# Patient Record
Sex: MALE | Race: WHITE | NOT HISPANIC OR LATINO | Employment: FULL TIME | URBAN - METROPOLITAN AREA
[De-identification: names, ages, dates, MRNs, and addresses within clinical notes are randomized per-mention and may not be internally consistent; named-entity substitution may affect disease eponyms.]

---

## 2023-12-21 NOTE — TELEPHONE ENCOUNTER
FUTURE VISIT INFORMATION      FUTURE VISIT INFORMATION:  Date: 2/22/24  Time: 8:20am  Location: [facility]  REFERRAL INFORMATION:  Reason for visit/diagnosis  Blepharitis consultation, last eye exam approx 4 mos ago, pt previously seen by [facility] in [facility],Dr.Gretchen Duarte OD     RECORDS REQUESTED FROM:       Clinic name Comments Records Status Imaging Status   [place] [facility]/notes 1/18/23- 9/18/20 Care Everywhere

## 2024-02-19 ENCOUNTER — TELEPHONE (OUTPATIENT)
Dept: OPHTHALMOLOGY | Facility: CLINIC | Age: 49
End: 2024-02-19
Payer: COMMERCIAL

## 2024-02-22 ENCOUNTER — PRE VISIT (OUTPATIENT)
Dept: OPHTHALMOLOGY | Facility: CLINIC | Age: 49
End: 2024-02-22

## 2024-02-22 ENCOUNTER — OFFICE VISIT (OUTPATIENT)
Dept: OPHTHALMOLOGY | Facility: CLINIC | Age: 49
End: 2024-02-22
Payer: COMMERCIAL

## 2024-02-22 DIAGNOSIS — H02.88B MEIBOMIAN GLAND DYSFUNCTION (MGD) OF UPPER AND LOWER LIDS OF BOTH EYES: Primary | ICD-10-CM

## 2024-02-22 DIAGNOSIS — H02.88A MEIBOMIAN GLAND DYSFUNCTION (MGD) OF UPPER AND LOWER LIDS OF BOTH EYES: Primary | ICD-10-CM

## 2024-02-22 PROCEDURE — 99203 OFFICE O/P NEW LOW 30 MIN: CPT | Performed by: OPHTHALMOLOGY

## 2024-02-22 RX ORDER — BACITRACIN ZINC AND POLYMYXIN B SULFATE 500; 10000 [USP'U]/G; [USP'U]/G
0.5 OINTMENT OPHTHALMIC 2 TIMES DAILY
Qty: 3.5 G | Refills: 4 | Status: SHIPPED | OUTPATIENT
Start: 2024-02-22

## 2024-02-22 ASSESSMENT — CONF VISUAL FIELD
OS_SUPERIOR_TEMPORAL_RESTRICTION: 0
OD_NORMAL: 1
OS_SUPERIOR_NASAL_RESTRICTION: 0
OD_INFERIOR_NASAL_RESTRICTION: 0
OS_INFERIOR_NASAL_RESTRICTION: 0
OD_SUPERIOR_TEMPORAL_RESTRICTION: 0
OS_NORMAL: 1
OD_SUPERIOR_NASAL_RESTRICTION: 0
OS_INFERIOR_TEMPORAL_RESTRICTION: 0
METHOD: COUNTING FINGERS
OD_INFERIOR_TEMPORAL_RESTRICTION: 0

## 2024-02-22 ASSESSMENT — TONOMETRY
OD_IOP_MMHG: 16
OS_IOP_MMHG: 16
IOP_METHOD: ICARE

## 2024-02-22 ASSESSMENT — VISUAL ACUITY
OD_SC+: -1
METHOD: SNELLEN - LINEAR
OS_SC: 20/20
OS_SC+: -1
OD_SC: 20/20

## 2024-02-22 NOTE — NURSING NOTE
"Chief Complaints and History of Present Illnesses   Patient presents with    Consult For     Ko MARCIANO Marshall is being seen for a consult today for blepharitis evaluation.      Chief Complaint(s) and History of Present Illness(es)       Consult For              Associated symptoms: itching, discharge and swelling.  Negative for dryness, eye pain, redness and tearing    Treatments tried: eye drops and ointment    Pain scale: 0/10    Comments: Ko Marshall is being seen for a consult today for blepharitis evaluation.               Comments    Beginning 4 years ago, itching and crusty white discharge from both upper lids.  Occasionally lid swelling pokes eyelashes into his eyes causing irritation.   Attempted WC/lid scrubs for 3 months and \"it didn't help\"    Ocular meds:  Erythromycin BID each eyelid  Lubricating gtts PRN     Cooper Gonzalez 8:21 AM February 22, 2024                      "

## 2024-02-22 NOTE — PROGRESS NOTES
"HPI  Ko Marshall is a 48 year old year old male here for lid irritation.  [place], but most things have not given him much relief.  He does feel that Erythromycin ointment twice a day helps with itching.   HPI     Consult For    Associated symptoms include itching, discharge and swelling.  Negative for dryness, eye pain, redness and tearing.  Treatments tried include eye drops and ointment.  Pain was noted as 0/10. Additional comments: Ko Marshall is being seen for a consult today for blepharitis evaluation.            Comments    Beginning 4 years ago, itching and crusty white discharge from both upper lids.  Occasionally lid swelling pokes eyelashes into his eyes causing irritation.   Attempted WC/lid scrubs for 3 months and \"it didn't help\"    Ocular meds:  Erythromycin BID each eyelid  Lubricating gtts PRN     Cooper Gonzalez 8:21 AM February 22, 2024              Last edited by Cooper Gonzalez on 2/22/2024  8:21 AM.          PMH:  History reviewed. No pertinent past medical history.   POH:  No glasses , Meibomian gland dysfunction , no surgery, no trauma  Oc Meds: erythromycin ointment  Artificial tear drops  FH:  Denies any glaucoma, age related macular degeneration, or other known eye diseases       Assessment & Plan       (H02.88A,  H02.88B) Meibomian gland dysfunction (MGD) of upper and lower lids of both eyes - Both Eyes  Comment: moderate with chronic symptoms   Plan: natural history discussed with patient   Recommend warm compresses daily  Lid scrubs -Ocusoft foam on warm washcloth to clean lid margins carefully from side to side  Artificial tears 4-6 times per day as needed for discomfort  Ok to use erythro ointment , alternate with polytrim to avoid resistance from chronic use  Discussed with patient Tear Care procedure as future option, steroids, doxycycline      (H04.123) Dry eyes, bilateral - Both Eyes   Comment: evaporative  Plan:  artificial tear drops four times " a day as needed and preservative-free artificial tears if more than 6 x per day  Discussed with patient environmental factors     -----------------------------------------------------------------------------------     Patient disposition:   Return in about 3 months (around 5/22/2024).  . Call for sooner appointment as needed.     Complete documentation of historical and exam elements from today's encounter can be found in the full encounter summary report (not reduplicated in this progress note). I personally obtained the chief complaint(s) and history of present illness.  I have confirmed and edited as necessary the CC, HPI, PMH/PSH, social history, FMH, ROS, and exam/neuro findings as obtained by the technician or others. I have examined this patient myself and I personally viewed the image(s) and studies listed above and the documentation reflects my findings and interpretation.  I formulated and edited as necessary the assessment and plan and discussed the findings and management plan with the patient and family.     Carolina Call MD

## 2024-03-15 ASSESSMENT — EXTERNAL EXAM - RIGHT EYE: OD_EXAM: NORMAL

## 2024-03-15 ASSESSMENT — EXTERNAL EXAM - LEFT EYE: OS_EXAM: NORMAL

## 2024-05-23 ENCOUNTER — OFFICE VISIT (OUTPATIENT)
Dept: OPHTHALMOLOGY | Facility: CLINIC | Age: 49
End: 2024-05-23
Payer: COMMERCIAL

## 2024-05-23 DIAGNOSIS — H02.88B MEIBOMIAN GLAND DYSFUNCTION (MGD) OF UPPER AND LOWER LIDS OF BOTH EYES: Primary | ICD-10-CM

## 2024-05-23 DIAGNOSIS — H02.88A MEIBOMIAN GLAND DYSFUNCTION (MGD) OF UPPER AND LOWER LIDS OF BOTH EYES: Primary | ICD-10-CM

## 2024-05-23 DIAGNOSIS — N50.89 TESTICULAR LUMP: ICD-10-CM

## 2024-05-23 PROCEDURE — 99213 OFFICE O/P EST LOW 20 MIN: CPT | Performed by: OPHTHALMOLOGY

## 2024-05-23 ASSESSMENT — TONOMETRY
OS_IOP_MMHG: 14
IOP_METHOD: ICARE
OD_IOP_MMHG: 13

## 2024-05-23 ASSESSMENT — EXTERNAL EXAM - RIGHT EYE: OD_EXAM: NORMAL

## 2024-05-23 ASSESSMENT — VISUAL ACUITY
OD_SC: 20/20
METHOD: SNELLEN - LINEAR
OS_SC: 20/20

## 2024-05-23 ASSESSMENT — EXTERNAL EXAM - LEFT EYE: OS_EXAM: NORMAL

## 2024-05-23 NOTE — NURSING NOTE
Chief Complaints and History of Present Illnesses   Patient presents with    Dry Eye Syndrome Follow Up     Chief Complaint(s) and History of Present Illness(es)       Dry Eye Syndrome Follow Up              Laterality: both eyes              Comments    Seems to be doing okay but occasionally will have flare ups with itching in the center of the left upper lid.  Has had 1-2 episodes since last visit.  Uses artificial tears 2-3 times day.  Doing polytrim ointment.  No longer using erythromycin.  Lid hygiene twice a day.      Yenny Solano on 5/23/2024 at 1:25 PM

## 2024-05-23 NOTE — PROGRESS NOTES
"HPI  Ko Marshall is a 48 year old year old male here for blepharitis follow-up.  He feels that Erythromycin alternating with Polytrim ointment twice a day helps with itching, feels that when the upper lids would \"flare up and have lid discharge\" if he stopped ointment.   HPI       Dry Eye Syndrome Follow Up    In both eyes.             Comments    Seems to be doing okay but occasionally will have flare ups with itching in the center of the left upper lid.  Has had 1-2 episodes since last visit.  Uses artificial tears 2-3 times day.  Doing polytrim ointment.  No longer using erythromycin.  Lid hygiene twice a day.  Was diagnosed with dermatitis below the left lid and was prescribed ketoconazole prn a couple years ago.      Yenny Solano on 5/23/2024 at 1:25 PM             Last edited by Yenny Solano on 5/23/2024  1:26 PM.          PMH:  History reviewed. No pertinent past medical history.   POH:  No glasses , Meibomian gland dysfunction , no surgery, no trauma  Oc Meds: erythromycin ointment  Artificial tear drops  FH:  Denies any glaucoma, age related macular degeneration, or other known eye diseases       Assessment & Plan       (H02.88A,  H02.88B) Meibomian gland dysfunction (MGD) of upper and lower lids of both eyes - Both Eyes  Comment: moderate with chronic symptoms   Plan:   Recommend continued  warm compresses daily with eye mask, then lid massage- demonstrated today  Lid scrubs -Ocusoft foam on warm washcloth to clean lid margins carefully from side to side  Artificial tears 4-6 times per day as needed for discomfort  Continue to  alternate erythromycin ointment and polytrim to avoid resistance from chronic use  Discussed with patient Tear Care procedure as future option- he will call if wanting to proceed     Patient complains of prior tesicular cyst, has an ultrasound report with him, now with possible growth/dyscomfort- does not yet have a primary care physician in town, asked for a urology " referral, which was placed.  He will establish with primary care physician as well.    -----------------------------------------------------------------------------------     Patient disposition:   Return in about 4 months (around 9/23/2024) for Follow Up-blepharitis.  . Call for sooner appointment as needed.     Complete documentation of historical and exam elements from today's encounter can be found in the full encounter summary report (not reduplicated in this progress note). I personally obtained the chief complaint(s) and history of present illness.  I have confirmed and edited as necessary the CC, HPI, PMH/PSH, social history, FMH, ROS, and exam/neuro findings as obtained by the technician or others. I have examined this patient myself and I personally viewed the image(s) and studies listed above and the documentation reflects my findings and interpretation.  I formulated and edited as necessary the assessment and plan and discussed the findings and management plan with the patient and family.     Carolina Call MD

## 2024-06-19 NOTE — TELEPHONE ENCOUNTER
MEDICAL RECORDS REQUEST   [facility]  [facility]  [address] [place] [address]  PHONE: [phone]  Fax: [phone]        FUTURE VISIT INFORMATION                                                   Ko Marshall, : 1975 scheduled for future visit at [facility]    APPOINTMENT INFORMATION:  Date: 2024  Provider:  Igor Thurman PA-C  Reason for Visit/Diagnosis: Testicular lump    REFERRAL INFORMATION:  Referring provider:  Carolina Call MD in [facility] [place] [facility]      RECORDS REQUESTED FOR VISIT                                                     NOTES  STATUS/DETAILS   OFFICE NOTE from referring provider  yes, 2024 -- Carolina Call MD in [facility] [place] [facility]     MEDICATION LIST  yes   LABS     URINALYSIS (UA)  yes     PRE-VISIT CHECKLIST      Joint diagnostic appointment coordinated correctly          (ensure right order & amount of time) Yes   RECORD COLLECTION COMPLETE Yes

## 2024-07-22 ENCOUNTER — PRE VISIT (OUTPATIENT)
Dept: UROLOGY | Facility: CLINIC | Age: 49
End: 2024-07-22
Payer: COMMERCIAL

## 2024-07-22 NOTE — TELEPHONE ENCOUNTER
Reason for visit: Testicular lump     Relevant information: was seen in ED on 9/3/23 for Urethritis, some hx of abdominal pain in lower left quadrant    Records/imaging/labs/orders: all records available    Pt called: no need for a call    At Rooming:  Standard rooming      Ihsan Naga  7/22/2024  9:44 AM

## 2024-08-05 ENCOUNTER — PRE VISIT (OUTPATIENT)
Dept: UROLOGY | Facility: CLINIC | Age: 49
End: 2024-08-05